# Patient Record
Sex: FEMALE | Race: WHITE | HISPANIC OR LATINO
[De-identification: names, ages, dates, MRNs, and addresses within clinical notes are randomized per-mention and may not be internally consistent; named-entity substitution may affect disease eponyms.]

---

## 2017-09-13 ENCOUNTER — TRANSCRIPTION ENCOUNTER (OUTPATIENT)
Age: 67
End: 2017-09-13

## 2020-02-13 ENCOUNTER — APPOINTMENT (OUTPATIENT)
Dept: RHEUMATOLOGY | Facility: CLINIC | Age: 70
End: 2020-02-13
Payer: MEDICAID

## 2020-02-13 VITALS — HEART RATE: 70 BPM | DIASTOLIC BLOOD PRESSURE: 82 MMHG | RESPIRATION RATE: 14 BRPM | SYSTOLIC BLOOD PRESSURE: 128 MMHG

## 2020-02-13 DIAGNOSIS — G51.0 BELL'S PALSY: ICD-10-CM

## 2020-02-13 DIAGNOSIS — I10 ESSENTIAL (PRIMARY) HYPERTENSION: ICD-10-CM

## 2020-02-13 DIAGNOSIS — E78.5 HYPERLIPIDEMIA, UNSPECIFIED: ICD-10-CM

## 2020-02-13 DIAGNOSIS — M54.2 CERVICALGIA: ICD-10-CM

## 2020-02-13 DIAGNOSIS — E11.9 TYPE 2 DIABETES MELLITUS W/OUT COMPLICATIONS: ICD-10-CM

## 2020-02-13 DIAGNOSIS — M54.9 DORSALGIA, UNSPECIFIED: ICD-10-CM

## 2020-02-13 PROBLEM — Z00.00 ENCOUNTER FOR PREVENTIVE HEALTH EXAMINATION: Status: ACTIVE | Noted: 2020-02-13

## 2020-02-13 PROCEDURE — 99204 OFFICE O/P NEW MOD 45 MIN: CPT

## 2020-02-13 RX ORDER — DILTIAZEM HYDROCHLORIDE 60 MG/1
TABLET, COATED ORAL
Refills: 0 | Status: ACTIVE | COMMUNITY

## 2020-02-13 RX ORDER — ROSUVASTATIN CALCIUM 5 MG/1
TABLET, FILM COATED ORAL
Refills: 0 | Status: ACTIVE | COMMUNITY

## 2020-02-13 RX ORDER — PIOGLITAZONE HYDROCHLORIDE 45 MG/1
TABLET ORAL
Refills: 0 | Status: ACTIVE | COMMUNITY

## 2020-02-13 RX ORDER — MECLIZINE HYDROCHLORIDE 25 MG/1
TABLET ORAL
Refills: 0 | Status: ACTIVE | COMMUNITY

## 2020-02-13 RX ORDER — ASPIRIN 81 MG
81 TABLET, DELAYED RELEASE (ENTERIC COATED) ORAL
Refills: 0 | Status: ACTIVE | COMMUNITY

## 2020-02-24 PROBLEM — M54.2 CERVICAL SPINE PAIN: Status: ACTIVE | Noted: 2020-02-24

## 2020-02-24 NOTE — REVIEW OF SYSTEMS
[Negative] : Heme/Lymph [Feeling Tired] : feeling tired [Dry Eyes] : dryness of the eyes [Heartburn] : heartburn [As Noted in HPI] : as noted in HPI [FreeTextEntry4] : dry mouth  [FreeTextEntry8] : CVA R sided pain

## 2020-02-24 NOTE — PHYSICAL EXAM
[General Appearance - Alert] : alert [General Appearance - In No Acute Distress] : in no acute distress [General Appearance - Well Nourished] : well nourished [Sclera] : the sclera and conjunctiva were normal [Extraocular Movements] : extraocular movements were intact [PERRL With Normal Accommodation] : pupils were equal in size, round, and reactive to light [Nasal Cavity] : the nasal mucosa and septum were normal [Neck Appearance] : the appearance of the neck was normal [Thyroid Diffuse Enlargement] : the thyroid was not enlarged [Auscultation Breath Sounds / Voice Sounds] : lungs were clear to auscultation bilaterally [Heart Rate And Rhythm] : heart rate was normal and rhythm regular [Heart Sounds] : normal S1 and S2 [Murmurs] : no murmurs [Edema] : there was no peripheral edema [Bowel Sounds] : normal bowel sounds [Cervical Lymph Nodes Enlarged Anterior Bilaterally] : anterior cervical [Abdomen Soft] : soft [Abdomen Tenderness] : non-tender [Supraclavicular Lymph Nodes Enlarged Bilaterally] : supraclavicular [No Spinal Tenderness] : no spinal tenderness [Abnormal Walk] : normal gait [Musculoskeletal - Swelling] : no joint swelling seen [Motor Tone] : muscle strength and tone were normal [Motor Exam] : the motor exam was normal [] : no rash [Oriented To Time, Place, And Person] : oriented to person, place, and time [No Focal Deficits] : no focal deficits [Affect] : the affect was normal [Impaired Insight] : insight and judgment were intact [FreeTextEntry1] : + R sided CVA tenderness

## 2020-02-24 NOTE — ASSESSMENT
[FreeTextEntry1] : ZULY JEWELL is a 70 year old woman who presents with prior dx of Sjogrens in [place], + oral and ocular sicca, fatigue, Rayanyds, some skin tightening over fingers. Also with chronic R sided CVA tenderness but no urinary symptoms. Previously on Rituxan infusions. + C spine TTP intermittently. \par \par - labs as below to w/u for CTD antibodies and preimmunosppression \par - check C spine XR to eval for instability \par - renal U/S to eval for CVA TTP \par - dental referral\par - optho referral for ocular sicca and PLQ baseline retinal check \par - dicussed PLQ initiation if G6PD normal as adjunct therapy \par - RTC in 3 weeks

## 2020-02-24 NOTE — HISTORY OF PRESENT ILLNESS
[FreeTextEntry1] : ZULY JEWELL is a 70 year old woman with prior dx of Sjogrens, here to establish rheumatologic care. Dx 2011 in [place], has been receiving care there since now. Initially on prednisone and Imuran, developed upper GI perforation so no further oral meds. Transitioned to Rituxan q6 months, with significant improvement in fatigue, last dose July 2019. No ADRs with Rituxan. \par \par + Ocular sicca -- on rx eye drops, not sure of name. No abrasions. \par + Oral sicca with tongue fissures -- Biotene, PO hydration \par No salivary gland pain but some chronic fullness \par + fatigue\par + b/l CTS \par + Raynauds without ulcerations, threatened digits\par + skin tightening limited to fingers \par \par No weakness, myalgias \par No F/C, night sweats, unintentional weight loss\par No arthralgias\par No rashes \par Renal disease 2/2 DM2, no Sjogrens involvement \par \par EGD with perforation, GERD, hiatal hernia -- stable on omeprazole\par Age appropriate malignancy screen - mammogram and pap UTD, never colonoscopy \par \par + chronic mild R sided CVA tenderness\par + chronic C spine pain with movement

## 2020-03-16 ENCOUNTER — APPOINTMENT (OUTPATIENT)
Dept: RHEUMATOLOGY | Facility: CLINIC | Age: 70
End: 2020-03-16

## 2020-06-15 ENCOUNTER — APPOINTMENT (OUTPATIENT)
Dept: RHEUMATOLOGY | Facility: CLINIC | Age: 70
End: 2020-06-15
Payer: MEDICAID

## 2020-06-15 VITALS
WEIGHT: 196 LBS | SYSTOLIC BLOOD PRESSURE: 102 MMHG | TEMPERATURE: 97.8 F | HEART RATE: 60 BPM | DIASTOLIC BLOOD PRESSURE: 62 MMHG | OXYGEN SATURATION: 99 %

## 2020-06-15 PROCEDURE — 99214 OFFICE O/P EST MOD 30 MIN: CPT

## 2020-06-15 NOTE — REVIEW OF SYSTEMS
[Feeling Tired] : feeling tired [Dry Eyes] : dryness of the eyes [Heartburn] : heartburn [As Noted in HPI] : as noted in HPI [Negative] : Genitourinary [FreeTextEntry4] : dry mouth

## 2020-06-15 NOTE — ASSESSMENT
[FreeTextEntry1] : ZULY JEWELL is a 70 year old woman with Sjogrens / mild CREST, previously on Rituxan infusions but has remained stable clinically off all DMARD meds since mid-2019. Able to function, sicca not severely debilitating, no advancement of skin changes, GERD, or worsening Rayanuds. + C spine pain with muscle involvement on R side and R lumbar paraspinal muscle pain, not present on exam today. \par \par - labs as below to monitor \par - discussed that given overall stability and lack of advancement since last visit, we will monitor her off medications at this time \par - dental f/u q6 months highly encouraged to maintain oral hygiene \par - optho evaluation q6 months for ocular sicca \par - RTC in 6 months, sooner if new/worsening sx

## 2020-06-15 NOTE — PHYSICAL EXAM
[General Appearance - Alert] : alert [Sclera] : the sclera and conjunctiva were normal [General Appearance - Well Nourished] : well nourished [General Appearance - In No Acute Distress] : in no acute distress [Extraocular Movements] : extraocular movements were intact [PERRL With Normal Accommodation] : pupils were equal in size, round, and reactive to light [Neck Appearance] : the appearance of the neck was normal [Nasal Cavity] : the nasal mucosa and septum were normal [Thyroid Diffuse Enlargement] : the thyroid was not enlarged [Heart Sounds] : normal S1 and S2 [Heart Rate And Rhythm] : heart rate was normal and rhythm regular [Edema] : there was no peripheral edema [Murmurs] : no murmurs [Abdomen Tenderness] : non-tender [Abdomen Soft] : soft [Bowel Sounds] : normal bowel sounds [No Spinal Tenderness] : no spinal tenderness [Supraclavicular Lymph Nodes Enlarged Bilaterally] : supraclavicular [Cervical Lymph Nodes Enlarged Anterior Bilaterally] : anterior cervical [Abnormal Walk] : normal gait [Musculoskeletal - Swelling] : no joint swelling seen [Motor Tone] : muscle strength and tone were normal [] : no rash [Motor Exam] : the motor exam was normal [No Focal Deficits] : no focal deficits [Impaired Insight] : insight and judgment were intact [Oriented To Time, Place, And Person] : oriented to person, place, and time [Affect] : the affect was normal [No CVA Tenderness] : no ~M costovertebral angle tenderness [FreeTextEntry1] : mild loss of skin folds distal fingers b/l, no limitation to ROM. Remainder of skin normal

## 2020-06-15 NOTE — DATA REVIEWED
[FreeTextEntry1] : Paper records reviewed, scanned to EMR. Pertinent labs and imaging summarized in HPI.

## 2020-06-15 NOTE — HISTORY OF PRESENT ILLNESS
[FreeTextEntry1] : ZULY JEWELL is a 70 year old woman with Sjogrens/ mild CREST overlap  (oral and ocular sicca, Raynauds, mild sclerodactyly, GERD, SSA/centromere +). Dx 2011 in [place] -- Initially on prednisone and Imuran, developed upper GI perforation so no further oral meds. Transitioned to Rituxan q6 months, with significant improvement in fatigue, last dose July 2019. No ADRs with Rituxan. \par \par + Ocular sicca -- No current eye drops, no significant crusting or chronic dryness. No abrasions. \par + Oral sicca with tongue fissures in the past -- managed with Biotene, PO hydration. No oral ulcers. \par No salivary gland pain but some chronic fullness \par + fatigue, stable, able to do all ADLs\par + b/l CTS, stable\par + Raynauds without ulcerations, threatened digits -- stable, not present currently 2/2 warmer temps \par + skin tightening limited to fingers and has not advanced in the last 6 months. \par \par No weakness, myalgias \par No F/C, night sweats, unintentional weight loss\par No arthralgias, synovitis \par No rashes \par Renal disease 2/2 DM2, no Sjogrens involvement \par R sided neck and back muscular pain -- tylenol helps \par R 3rd PIP triggering, mild\par \par EGD with perforation, GERD, hiatal hernia -- stable on omeprazole and started on new medication recently \par Age appropriate malignancy screen - mammogram and pap UTD, never colonoscopy \par \par No F/C, infectious sx. 1 episode of covid negative PNA, resolved. Has been observing covid recommendations of social distancing and wearing a mask when in public. \par \par Labs - + DALLAS, centromere, SSA \par Negative - Hep B/C, Quant, Igg4, SPEP, SSB, C3/4, RNP/Sm, Scl-70, ESR/CRP, RF/CCP, Thyroid Abs\par Renal US negative\par XR C spine - mild DJD

## 2020-12-21 ENCOUNTER — APPOINTMENT (OUTPATIENT)
Dept: RHEUMATOLOGY | Facility: CLINIC | Age: 70
End: 2020-12-21
Payer: MEDICAID

## 2020-12-21 VITALS
DIASTOLIC BLOOD PRESSURE: 79 MMHG | OXYGEN SATURATION: 99 % | SYSTOLIC BLOOD PRESSURE: 145 MMHG | TEMPERATURE: 97.9 F | WEIGHT: 205 LBS | HEART RATE: 62 BPM

## 2020-12-21 PROCEDURE — 99072 ADDL SUPL MATRL&STAF TM PHE: CPT

## 2020-12-21 PROCEDURE — 99214 OFFICE O/P EST MOD 30 MIN: CPT

## 2020-12-31 NOTE — REVIEW OF SYSTEMS
[Feeling Tired] : feeling tired [Dry Eyes] : dryness of the eyes [Heartburn] : heartburn [As Noted in HPI] : as noted in HPI [Negative] : Heme/Lymph [FreeTextEntry4] : dry mouth

## 2020-12-31 NOTE — HISTORY OF PRESENT ILLNESS
[FreeTextEntry1] : ZULY JEWELL is a 70 year old woman with Sjogrens/ mild CREST overlap  (oral and ocular sicca, Raynauds, mild sclerodactyly, GERD, SSA/centromere +). Dx 2011 in [place] -- Initially on prednisone and Imuran, developed upper GI perforation so no further oral meds. Transitioned to Rituxan q6 months, with significant improvement in fatigue, last dose July 2019. No ADRs with Rituxan. \par \par + Ocular sicca -- No current eye drops, no significant crusting or chronic dryness. No abrasions. \par + Oral sicca with tongue fissures in the past -- managed with Biotene, PO hydration. No oral ulcers. \par No salivary gland pain but some chronic fullness \par + fatigue, stable, able to do all ADLs\par + b/l CTS, stable\par + Raynauds without ulcerations, threatened digits -- stable, not present currently 2/2 warmer temps \par + skin tightening limited to fingers and has not advanced in the last 6 months. \par \par No weakness, myalgias \par No F/C, night sweats, unintentional weight loss\par No arthralgias, synovitis \par No rashes \par Renal disease 2/2 DM2, no Sjogrens involvement \par R sided neck and back muscular pain -- tylenol helps \par R 3rd PIP triggering, mild\par \par EGD with perforation, GERD, hiatal hernia -- stable on omeprazole and started on new medication recently \par Age appropriate malignancy screen - mammogram and pap UTD, never colonoscopy \par \par No F/C, infectious sx. 1 episode of covid negative PNA, resolved. Has been observing covid recommendations of social distancing and wearing a mask when in public. \par \par Labs - + DALLAS, centromere, SSA \par Negative - Hep B/C, Quant, Igg4, SPEP, SSB, C3/4, RNP/Sm, Scl-70, ESR/CRP, RF/CCP, Thyroid Abs\par Renal US negative\par XR C spine - mild DJD\par --------------\par \par 12/21/20 -- L shoulder, muscle spasm in back. Celebrex 200mg BID is helping. No other small joint pain or inflammatory sx, stable Raynauds, sicca actually slightly improved, no CREST sx. Feels well otherwise. \par

## 2020-12-31 NOTE — ASSESSMENT
[FreeTextEntry1] : ZULY JEWELL is a 70 year old woman with Sjogrens / mild CREST, previously on Rituxan infusions but has remained stable clinically off all DMARD meds since mid-2019. Able to function, sicca not severely debilitating, no advancement of skin changes, GERD, or worsening Rayanuds. + C spine pain with muscle involvement now on L side, controlled with NSAIDs. \par \par - given all sx are currently stable, will hold off on labs and treatment at present -- will continue to monitor her \par - dental f/u q6 months highly encouraged to maintain oral hygiene \par - optho evaluation q6 months for ocular sicca \par -  Non-pharmacological measures for Raynaud's - gloves, pocket warmers, limiting cold exposure \par - c/w celebrex with food \par - RTC in 6 months, sooner if new/worsening sx \par

## 2020-12-31 NOTE — PHYSICAL EXAM
[General Appearance - Alert] : alert [General Appearance - In No Acute Distress] : in no acute distress [General Appearance - Well Nourished] : well nourished [Sclera] : the sclera and conjunctiva were normal [PERRL With Normal Accommodation] : pupils were equal in size, round, and reactive to light [Extraocular Movements] : extraocular movements were intact [Nasal Cavity] : the nasal mucosa and septum were normal [Neck Appearance] : the appearance of the neck was normal [Thyroid Diffuse Enlargement] : the thyroid was not enlarged [Heart Rate And Rhythm] : heart rate was normal and rhythm regular [Heart Sounds] : normal S1 and S2 [Murmurs] : no murmurs [Edema] : there was no peripheral edema [Bowel Sounds] : normal bowel sounds [Abdomen Soft] : soft [Abdomen Tenderness] : non-tender [Cervical Lymph Nodes Enlarged Anterior Bilaterally] : anterior cervical [Supraclavicular Lymph Nodes Enlarged Bilaterally] : supraclavicular [No CVA Tenderness] : no ~M costovertebral angle tenderness [No Spinal Tenderness] : no spinal tenderness [Abnormal Walk] : normal gait [Musculoskeletal - Swelling] : no joint swelling seen [Motor Tone] : muscle strength and tone were normal [] : no rash [Motor Exam] : the motor exam was normal [No Focal Deficits] : no focal deficits [Oriented To Time, Place, And Person] : oriented to person, place, and time [Impaired Insight] : insight and judgment were intact [Affect] : the affect was normal [Cervical Lymph Nodes Enlarged Posterior Bilaterally] : posterior cervical [FreeTextEntry1] : mild loss of skin folds distal fingers b/l, no limitation to ROM. Remainder of skin normal

## 2021-01-22 ENCOUNTER — NON-APPOINTMENT (OUTPATIENT)
Age: 71
End: 2021-01-22

## 2021-02-16 ENCOUNTER — APPOINTMENT (OUTPATIENT)
Dept: RADIOLOGY | Facility: CLINIC | Age: 71
End: 2021-02-16
Payer: MEDICAID

## 2021-02-16 PROCEDURE — 71046 X-RAY EXAM CHEST 2 VIEWS: CPT

## 2021-02-24 ENCOUNTER — NON-APPOINTMENT (OUTPATIENT)
Age: 71
End: 2021-02-24

## 2021-06-12 ENCOUNTER — APPOINTMENT (OUTPATIENT)
Dept: RADIOLOGY | Facility: CLINIC | Age: 71
End: 2021-06-12
Payer: MEDICAID

## 2021-06-12 PROCEDURE — 77080 DXA BONE DENSITY AXIAL: CPT

## 2021-06-28 ENCOUNTER — APPOINTMENT (OUTPATIENT)
Dept: RHEUMATOLOGY | Facility: CLINIC | Age: 71
End: 2021-06-28
Payer: MEDICAID

## 2021-06-28 ENCOUNTER — NON-APPOINTMENT (OUTPATIENT)
Age: 71
End: 2021-06-28

## 2021-06-28 VITALS
TEMPERATURE: 97.7 F | BODY MASS INDEX: 39.75 KG/M2 | OXYGEN SATURATION: 98 % | WEIGHT: 216 LBS | HEIGHT: 62 IN | HEART RATE: 84 BPM | SYSTOLIC BLOOD PRESSURE: 166 MMHG | DIASTOLIC BLOOD PRESSURE: 81 MMHG

## 2021-06-28 PROCEDURE — 99214 OFFICE O/P EST MOD 30 MIN: CPT

## 2021-06-28 NOTE — HISTORY OF PRESENT ILLNESS
[FreeTextEntry1] : ZULY JEWELL is a 71 year old woman with Sjogrens/ mild CREST overlap  (oral and ocular sicca, Raynauds, mild sclerodactyly, GERD, SSA/centromere +). Dx 2011 in [place] -- Initially on prednisone and Imuran, developed upper GI perforation so no further oral meds. Transitioned to Rituxan q6 months, with significant improvement in fatigue, last dose July 2019. No ADRs with Rituxan. \par \par + Ocular sicca -- No current eye drops, no significant crusting or chronic dryness. No abrasions. \par + Oral sicca with tongue fissures in the past -- managed with Biotene, PO hydration. No oral ulcers. \par No salivary gland pain but some chronic fullness \par + fatigue, stable, able to do all ADLs\par + b/l CTS, stable\par + Raynauds without ulcerations, threatened digits -- stable, not present currently 2/2 warmer temps \par + skin tightening limited to fingers and has not advanced in the last 6 months. \par \par No weakness, myalgias \par No F/C, night sweats, unintentional weight loss\par No arthralgias, synovitis \par No rashes \par Renal disease 2/2 DM2, no Sjogrens involvement \par R sided neck and back muscular pain -- tylenol helps \par R 3rd PIP triggering, mild\par \par EGD with perforation, GERD, hiatal hernia -- stable on omeprazole and started on new medication recently \par Age appropriate malignancy screen - mammogram and pap UTD, never colonoscopy \par \par No F/C, infectious sx. 1 episode of covid negative PNA, resolved. Has been observing covid recommendations of social distancing and wearing a mask when in public. \par \par Labs - + DALLAS, centromere, SSA \par Negative - Hep B/C, Quant, Igg4, SPEP, SSB, C3/4, RNP/Sm, Scl-70, ESR/CRP, RF/CCP, Thyroid Abs\par Renal US negative\par XR C spine - mild DJD\par --------------\par \par 12/21/20 -- L shoulder, muscle spasm in back. Celebrex 200mg BID is helping. No other small joint pain or inflammatory sx, stable Raynauds, sicca actually slightly improved, no CREST sx. Feels well otherwise. \par \par 6/28/21 -- Feels well, stable fatigue, energy levels good, able to all ADLs. Sicca in eyes and mouth stable, no inflammatory arthritis, rashes, CP/SOB, GI or  sx, Raynauds stable. Recent R sided ear infection and PNA, resolved with Abx. Wants to get dental implants. Not using NSAIDs daily.

## 2021-06-28 NOTE — REVIEW OF SYSTEMS
[Dry Eyes] : dryness of the eyes [As Noted in HPI] : as noted in HPI [Negative] : Musculoskeletal [FreeTextEntry4] : dry mouth

## 2021-06-28 NOTE — PHYSICAL EXAM
[General Appearance - Alert] : alert [General Appearance - In No Acute Distress] : in no acute distress [General Appearance - Well Nourished] : well nourished [Sclera] : the sclera and conjunctiva were normal [PERRL With Normal Accommodation] : pupils were equal in size, round, and reactive to light [Extraocular Movements] : extraocular movements were intact [Nasal Cavity] : the nasal mucosa and septum were normal [Neck Appearance] : the appearance of the neck was normal [Thyroid Diffuse Enlargement] : the thyroid was not enlarged [Heart Rate And Rhythm] : heart rate was normal and rhythm regular [Heart Sounds] : normal S1 and S2 [Murmurs] : no murmurs [Edema] : there was no peripheral edema [Bowel Sounds] : normal bowel sounds [Abdomen Soft] : soft [Abdomen Tenderness] : non-tender [Cervical Lymph Nodes Enlarged Posterior Bilaterally] : posterior cervical [Cervical Lymph Nodes Enlarged Anterior Bilaterally] : anterior cervical [Supraclavicular Lymph Nodes Enlarged Bilaterally] : supraclavicular [No CVA Tenderness] : no ~M costovertebral angle tenderness [No Spinal Tenderness] : no spinal tenderness [Abnormal Walk] : normal gait [Musculoskeletal - Swelling] : no joint swelling seen [Motor Tone] : muscle strength and tone were normal [] : no rash [Motor Exam] : the motor exam was normal [No Focal Deficits] : no focal deficits [Oriented To Time, Place, And Person] : oriented to person, place, and time [Impaired Insight] : insight and judgment were intact [Affect] : the affect was normal [Outer Ear] : the ears and nose were normal in appearance [Both Tympanic Membranes Were Examined] : both tympanic membranes were normal [FreeTextEntry1] : mild loss of skin folds distal fingers b/l, no limitation to ROM. Remainder of skin normal

## 2021-06-28 NOTE — ASSESSMENT
[FreeTextEntry1] : ZULY JEWELL is a 71 year old woman with Sjogrens / mild CREST, previously on Rituxan infusions but has remained stable clinically off all DMARD meds since mid-2019. Able to function, sicca not severely debilitating, no advancement of skin changes, GERD, or worsening Rayanuds -- sx actually appear stable and sicca appears to be improving. Planning for dental implants. \par \par - given all sx are currently stable, will continue to hold off on systemic treatment at present -- will continue to monitor her \par - dental f/u q6 months maintain oral hygiene -- no rheum contraindication to planned implants \par - optho evaluation q6 months for ocular sicca \par -  Non-pharmacological measures for Raynaud's - gloves, pocket warmers, limiting cold exposure \par - will obtain recent labs and DEXA from [facility] for review\par - RTC in 12 months, sooner if new/worsening sx \par

## 2021-09-29 ENCOUNTER — EMERGENCY (EMERGENCY)
Facility: HOSPITAL | Age: 71
LOS: 1 days | End: 2021-09-29
Admitting: EMERGENCY MEDICINE
Payer: MEDICAID

## 2021-09-29 PROCEDURE — 99283 EMERGENCY DEPT VISIT LOW MDM: CPT

## 2021-10-06 PROBLEM — I10 ESSENTIAL HYPERTENSION: Status: ACTIVE | Noted: 2020-02-13

## 2022-06-27 ENCOUNTER — APPOINTMENT (OUTPATIENT)
Dept: RHEUMATOLOGY | Facility: CLINIC | Age: 72
End: 2022-06-27

## 2022-06-27 VITALS
HEART RATE: 66 BPM | DIASTOLIC BLOOD PRESSURE: 80 MMHG | BODY MASS INDEX: 37.54 KG/M2 | HEIGHT: 62 IN | SYSTOLIC BLOOD PRESSURE: 120 MMHG | WEIGHT: 204 LBS | TEMPERATURE: 97.9 F | OXYGEN SATURATION: 96 %

## 2022-06-27 DIAGNOSIS — L98.9 DISORDER OF THE SKIN AND SUBCUTANEOUS TISSUE, UNSPECIFIED: ICD-10-CM

## 2022-06-27 DIAGNOSIS — R68.2 DRY MOUTH, UNSPECIFIED: ICD-10-CM

## 2022-06-27 PROCEDURE — 99213 OFFICE O/P EST LOW 20 MIN: CPT

## 2022-07-13 PROBLEM — R68.2 DRY MOUTH: Status: ACTIVE | Noted: 2020-02-13

## 2022-07-13 NOTE — HISTORY OF PRESENT ILLNESS
[FreeTextEntry1] : ZULY JEWELL is a 71 year old woman with Sjogrens/ mild CREST overlap  (oral and ocular sicca, Raynauds, mild sclerodactyly, GERD, SSA/centromere +). Dx 2011 in [place] -- Initially on prednisone and Imuran, developed upper GI perforation so no further oral meds. Transitioned to Rituxan q6 months, with significant improvement in fatigue, last dose July 2019. No ADRs with Rituxan. \par \par + Ocular sicca -- No current eye drops, no significant crusting or chronic dryness. No abrasions. \par + Oral sicca with tongue fissures in the past -- managed with Biotene, PO hydration. No oral ulcers. \par No salivary gland pain but some chronic fullness \par + fatigue, stable, able to do all ADLs\par + b/l CTS, stable\par + Raynauds without ulcerations, threatened digits -- stable, not present currently 2/2 warmer temps \par + skin tightening limited to fingers and has not advanced in the last 6 months. \par \par No weakness, myalgias \par No F/C, night sweats, unintentional weight loss\par No arthralgias, synovitis \par No rashes \par Renal disease 2/2 DM2, no Sjogrens involvement \par R sided neck and back muscular pain -- tylenol helps \par R 3rd PIP triggering, mild\par \par EGD with perforation, GERD, hiatal hernia -- stable on omeprazole and started on new medication recently \par Age appropriate malignancy screen - mammogram and pap UTD, never colonoscopy \par \par No F/C, infectious sx. 1 episode of covid negative PNA, resolved. Has been observing covid recommendations of social distancing and wearing a mask when in public. \par \par Labs - + D[place], centromere, SSA \par Negative - Hep B/C, Quant, Igg4, SPEP, SSB, C3/4, RNP/Sm, Scl-70, ESR/CRP, RF/CCP, Thyroid Abs\par Renal US negative\par XR C spine - mild DJD\par --------------\par \par 12/21/20 -- L shoulder, muscle spasm in back. Celebrex 200mg BID is helping. No other small joint pain or inflammatory sx, stable Raynauds, sicca actually slightly improved, no CREST sx. Feels well otherwise. \par \par 6/28/21 -- Feels well, stable fatigue, energy levels good, able to all ADLs. Sicca in eyes and mouth stable, no inflammatory arthritis, rashes, CP/SOB, GI or  sx, Raynauds stable. Recent R sided ear infection and PNA, resolved with Abx. Wants to get dental implants. Not using NSAIDs daily. \par \par 6/27/22 -- Ocular sicca minimal, ongoing dry mouth but responsive to PO hydration and no glandular swelling. No rashes, no small joint sx, no CP/SOB, GI/, raynauds stable. S/p dental implants. Rash onR leg, no other areas.

## 2022-07-13 NOTE — REVIEW OF SYSTEMS
[Dry Eyes] : dryness of the eyes [Negative] : Heme/Lymph [As Noted in HPI] : as noted in HPI [Skin Lesions] : skin lesion [FreeTextEntry4] : dry mouth

## 2022-07-13 NOTE — ASSESSMENT
[FreeTextEntry1] : ZULY JEWELL is a 72 year old woman with --\par \par # Sjogrens / mild CREST, previously on Rituxan infusions but has remained stable clinically off all DMARD meds since mid-2019. Able to function, sicca not severely debilitating, no advancement of skin changes, GERD, or worsening Rayanuds -- sx actually appear stable and sicca appears to be improving. \par - given all sx are currently stable, will continue to hold off on systemic treatment at present -- will continue to monitor her \par - dental f/u q6 months maintain oral hygiene -- no rheum contraindication to planned implants \par - optho evaluation q6 months for ocular sicca \par -  Non-pharmacological measures for Raynaud's - gloves, pocket warmers, limiting cold exposure \par \par # rash - does not appear inflammatory\par - derm referral\par \par # osteopenia on DEXA\par - Dietary Ca 600mg BID with food, Vit D 1000 IU daily to optimize Ca/Vit D to maintain bone health.\par - Weight bearing exercise for 30min 3-4x/week to maintain BMD as tolerated encouraged. \par \par RTC in 12 months, sooner if new/worsening sx \par

## 2022-07-13 NOTE — PHYSICAL EXAM
[General Appearance - Alert] : alert [General Appearance - In No Acute Distress] : in no acute distress [General Appearance - Well Nourished] : well nourished [Sclera] : the sclera and conjunctiva were normal [PERRL With Normal Accommodation] : pupils were equal in size, round, and reactive to light [Extraocular Movements] : extraocular movements were intact [Outer Ear] : the ears and nose were normal in appearance [Both Tympanic Membranes Were Examined] : both tympanic membranes were normal [Nasal Cavity] : the nasal mucosa and septum were normal [Neck Appearance] : the appearance of the neck was normal [Heart Rate And Rhythm] : heart rate was normal and rhythm regular [Heart Sounds] : normal S1 and S2 [Murmurs] : no murmurs [Bowel Sounds] : normal bowel sounds [Edema] : there was no peripheral edema [Abdomen Soft] : soft [Abdomen Tenderness] : non-tender [Cervical Lymph Nodes Enlarged Posterior Bilaterally] : posterior cervical [Cervical Lymph Nodes Enlarged Anterior Bilaterally] : anterior cervical [Supraclavicular Lymph Nodes Enlarged Bilaterally] : supraclavicular [No CVA Tenderness] : no ~M costovertebral angle tenderness [No Spinal Tenderness] : no spinal tenderness [Abnormal Walk] : normal gait [Musculoskeletal - Swelling] : no joint swelling seen [Motor Tone] : muscle strength and tone were normal [] : no rash [Motor Exam] : the motor exam was normal [No Focal Deficits] : no focal deficits [Oriented To Time, Place, And Person] : oriented to person, place, and time [Impaired Insight] : insight and judgment were intact [Affect] : the affect was normal [FreeTextEntry1] : mild loss of skin folds distal fingers b/l, no limitation to ROM. Focal area of rash on RLE

## 2023-05-25 ENCOUNTER — APPOINTMENT (OUTPATIENT)
Dept: CT IMAGING | Facility: CLINIC | Age: 73
End: 2023-05-25
Payer: MEDICAID

## 2023-05-25 PROCEDURE — 71250 CT THORAX DX C-: CPT

## 2023-08-31 ENCOUNTER — APPOINTMENT (OUTPATIENT)
Dept: RHEUMATOLOGY | Facility: CLINIC | Age: 73
End: 2023-08-31
Payer: MEDICAID

## 2023-08-31 VITALS
DIASTOLIC BLOOD PRESSURE: 80 MMHG | BODY MASS INDEX: 34.23 KG/M2 | OXYGEN SATURATION: 97 % | TEMPERATURE: 97.2 F | SYSTOLIC BLOOD PRESSURE: 128 MMHG | HEIGHT: 62 IN | WEIGHT: 186 LBS | HEART RATE: 71 BPM | RESPIRATION RATE: 15 BRPM

## 2023-08-31 PROCEDURE — 99214 OFFICE O/P EST MOD 30 MIN: CPT

## 2023-08-31 NOTE — HISTORY OF PRESENT ILLNESS
[FreeTextEntry1] : ZULY JEWELL is a 71 year old woman with Sjogrens/ mild CREST overlap  (oral and ocular sicca, Raynauds, mild sclerodactyly, GERD, SSA/centromere +). Dx 2011 in [place] -- Initially on prednisone and Imuran, developed upper GI perforation so no further oral meds. Transitioned to Rituxan q6 months, with significant improvement in fatigue, last dose July 2019. No ADRs with Rituxan. \par  \par  + Ocular sicca -- No current eye drops, no significant crusting or chronic dryness. No abrasions. \par  + Oral sicca with tongue fissures in the past -- managed with Biotene, PO hydration. No oral ulcers. \par  No salivary gland pain but some chronic fullness \par  + fatigue, stable, able to do all ADLs\par  + b/l CTS, stable\par  + Raynauds without ulcerations, threatened digits -- stable, not present currently 2/2 warmer temps \par  + skin tightening limited to fingers and has not advanced in the last 6 months. \par  \par  No weakness, myalgias \par  No F/C, night sweats, unintentional weight loss\par  No arthralgias, synovitis \par  No rashes \par  Renal disease 2/2 DM2, no Sjogrens involvement \par  R sided neck and back muscular pain -- tylenol helps \par  R 3rd PIP triggering, mild\par  \par  EGD with perforation, GERD, hiatal hernia -- stable on omeprazole and started on new medication recently \par  Age appropriate malignancy screen - mammogram and pap UTD, never colonoscopy \par  \par  No F/C, infectious sx. 1 episode of covid negative PNA, resolved. Has been observing covid recommendations of social distancing and wearing a mask when in public. \par  \par  Labs - + [place], centromere, SSA \par  Negative - Hep B/C, Quant, Igg4, SPEP, SSB, C3/4, RNP/Sm, Scl-70, ESR/CRP, RF/CCP, Thyroid Abs\par  Renal US negative\par  XR C spine - mild DJD\par  --------------\par  \par  12/21/20 -- L shoulder, muscle spasm in back. Celebrex 200mg BID is helping. No other small joint pain or inflammatory sx, stable Raynauds, sicca actually slightly improved, no CREST sx. Feels well otherwise. \par  \par  6/28/21 -- Feels well, stable fatigue, energy levels good, able to all ADLs. Sicca in eyes and mouth stable, no inflammatory arthritis, rashes, CP/SOB, GI or  sx, Raynauds stable. Recent R sided ear infection and PNA, resolved with Abx. Wants to get dental implants. Not using NSAIDs daily. \par  \par  6/27/22 -- Ocular sicca minimal, ongoing dry mouth but responsive to PO hydration and no glandular swelling. No rashes, no small joint sx, no CP/SOB, GI/, raynauds stable. S/p dental implants. Rash onR leg, no other areas.

## 2023-08-31 NOTE — PHYSICAL EXAM
[General Appearance - Alert] : alert [General Appearance - In No Acute Distress] : in no acute distress [General Appearance - Well Nourished] : well nourished [Sclera] : the sclera and conjunctiva were normal [PERRL With Normal Accommodation] : pupils were equal in size, round, and reactive to light [Extraocular Movements] : extraocular movements were intact [Outer Ear] : the ears and nose were normal in appearance [Both Tympanic Membranes Were Examined] : both tympanic membranes were normal [Nasal Cavity] : the nasal mucosa and septum were normal [Neck Appearance] : the appearance of the neck was normal [Heart Rate And Rhythm] : heart rate was normal and rhythm regular [Heart Sounds] : normal S1 and S2 [Murmurs] : no murmurs [Edema] : there was no peripheral edema [Bowel Sounds] : normal bowel sounds [Abdomen Soft] : soft [Abdomen Tenderness] : non-tender [Cervical Lymph Nodes Enlarged Posterior Bilaterally] : posterior cervical [Cervical Lymph Nodes Enlarged Anterior Bilaterally] : anterior cervical [Supraclavicular Lymph Nodes Enlarged Bilaterally] : supraclavicular [No CVA Tenderness] : no ~M costovertebral angle tenderness [No Spinal Tenderness] : no spinal tenderness [Abnormal Walk] : normal gait [Musculoskeletal - Swelling] : no joint swelling seen [Motor Tone] : muscle strength and tone were normal [] : no rash [FreeTextEntry1] : mild loss of skin folds distal fingers b/l, no limitation to ROM. Focal area of rash on RLE  [Motor Exam] : the motor exam was normal [No Focal Deficits] : no focal deficits [Oriented To Time, Place, And Person] : oriented to person, place, and time [Impaired Insight] : insight and judgment were intact [Affect] : the affect was normal

## 2023-08-31 NOTE — REVIEW OF SYSTEMS
[Dry Eyes] : dryness of the eyes [As Noted in HPI] : as noted in HPI [Skin Lesions] : skin lesion [Negative] : Heme/Lymph [FreeTextEntry4] : dry mouth

## 2023-09-01 ENCOUNTER — OUTPATIENT (OUTPATIENT)
Dept: OUTPATIENT SERVICES | Facility: HOSPITAL | Age: 73
LOS: 1 days | End: 2023-09-01
Payer: MEDICAID

## 2023-09-01 DIAGNOSIS — R13.19 OTHER DYSPHAGIA: ICD-10-CM

## 2023-09-01 PROCEDURE — 91010 ESOPHAGUS MOTILITY STUDY: CPT

## 2023-09-14 ENCOUNTER — APPOINTMENT (OUTPATIENT)
Dept: RHEUMATOLOGY | Facility: CLINIC | Age: 73
End: 2023-09-14

## 2023-10-22 LAB
ALBUMIN SERPL ELPH-MCNC: 4.4 G/DL
ALP BLD-CCNC: 130 U/L
ALT SERPL-CCNC: 12 U/L
ANION GAP SERPL CALC-SCNC: 12 MMOL/L
APPEARANCE: CLEAR
AST SERPL-CCNC: 16 U/L
BACTERIA: ABNORMAL /HPF
BASOPHILS # BLD AUTO: 0.04 K/UL
BASOPHILS NFR BLD AUTO: 0.6 %
BILIRUB SERPL-MCNC: 0.3 MG/DL
BILIRUBIN URINE: NEGATIVE
BLOOD URINE: NEGATIVE
BUN SERPL-MCNC: 13 MG/DL
CALCIUM SERPL-MCNC: 9.6 MG/DL
CAST: 2 /LPF
CHLORIDE SERPL-SCNC: 108 MMOL/L
CO2 SERPL-SCNC: 24 MMOL/L
COLOR: YELLOW
CREAT SERPL-MCNC: 0.96 MG/DL
CRP SERPL-MCNC: <3 MG/L
EGFR: 62 ML/MIN/1.73M2
EOSINOPHIL # BLD AUTO: 0.26 K/UL
EOSINOPHIL NFR BLD AUTO: 3.7 %
EPITHELIAL CELLS: 6 /HPF
ERYTHROCYTE [SEDIMENTATION RATE] IN BLOOD BY WESTERGREN METHOD: 13 MM/HR
GLUCOSE QUALITATIVE U: NEGATIVE MG/DL
GLUCOSE SERPL-MCNC: 206 MG/DL
HBV CORE IGG+IGM SER QL: NONREACTIVE
HBV SURFACE AB SER QL: NONREACTIVE
HBV SURFACE AG SER QL: NONREACTIVE
HCT VFR BLD CALC: 34.8 %
HCV AB SER QL: NONREACTIVE
HCV S/CO RATIO: 0.04 S/CO
HGB BLD-MCNC: 10.7 G/DL
IMM GRANULOCYTES NFR BLD AUTO: 0.3 %
KETONES URINE: NEGATIVE MG/DL
LEUKOCYTE ESTERASE URINE: ABNORMAL
LYMPHOCYTES # BLD AUTO: 0.99 K/UL
LYMPHOCYTES NFR BLD AUTO: 13.9 %
MAN DIFF?: NORMAL
MCHC RBC-ENTMCNC: 28.2 PG
MCHC RBC-ENTMCNC: 30.7 GM/DL
MCV RBC AUTO: 91.6 FL
MICROSCOPIC-UA: NORMAL
MONOCYTES # BLD AUTO: 0.52 K/UL
MONOCYTES NFR BLD AUTO: 7.3 %
NEUTROPHILS # BLD AUTO: 5.29 K/UL
NEUTROPHILS NFR BLD AUTO: 74.2 %
NITRITE URINE: NEGATIVE
PH URINE: 6
PLATELET # BLD AUTO: 235 K/UL
POTASSIUM SERPL-SCNC: 4.7 MMOL/L
PROT SERPL-MCNC: 6.5 G/DL
PROTEIN URINE: NEGATIVE MG/DL
RBC # BLD: 3.8 M/UL
RBC # FLD: 14.1 %
RED BLOOD CELLS URINE: 2 /HPF
REVIEW: NORMAL
SODIUM SERPL-SCNC: 144 MMOL/L
SPECIFIC GRAVITY URINE: 1.02
UROBILINOGEN URINE: 0.2 MG/DL
WBC # FLD AUTO: 7.12 K/UL
WHITE BLOOD CELLS URINE: 3 /HPF

## 2023-10-23 ENCOUNTER — APPOINTMENT (OUTPATIENT)
Dept: RHEUMATOLOGY | Facility: CLINIC | Age: 73
End: 2023-10-23
Payer: MEDICAID

## 2023-10-23 VITALS
BODY MASS INDEX: 34.23 KG/M2 | SYSTOLIC BLOOD PRESSURE: 163 MMHG | OXYGEN SATURATION: 98 % | DIASTOLIC BLOOD PRESSURE: 82 MMHG | HEART RATE: 61 BPM | HEIGHT: 62 IN | TEMPERATURE: 97.3 F | WEIGHT: 186 LBS

## 2023-10-23 DIAGNOSIS — D64.9 ANEMIA, UNSPECIFIED: ICD-10-CM

## 2023-10-23 DIAGNOSIS — H04.123 DRY EYE SYNDROME OF BILATERAL LACRIMAL GLANDS: ICD-10-CM

## 2023-10-23 LAB
C3 SERPL-MCNC: 173 MG/DL
C4 SERPL-MCNC: 35 MG/DL

## 2023-10-23 PROCEDURE — 99214 OFFICE O/P EST MOD 30 MIN: CPT

## 2023-10-24 LAB
FERRITIN SERPL-MCNC: 13 NG/ML
IRON SATN MFR SERPL: 14 %
IRON SERPL-MCNC: 48 UG/DL
TIBC SERPL-MCNC: 352 UG/DL
UIBC SERPL-MCNC: 304 UG/DL

## 2023-10-25 LAB
CENTROMERE IGG SER-ACNC: >8 AL
ENA SS-A AB SER IA-ACNC: 0.2 AL
ENA SS-B AB SER IA-ACNC: <0.2 AL
M TB IFN-G BLD-IMP: NEGATIVE
QUANTIFERON TB PLUS MITOGEN MINUS NIL: 3.31 IU/ML
QUANTIFERON TB PLUS NIL: 0.12 IU/ML
QUANTIFERON TB PLUS TB1 MINUS NIL: 0 IU/ML
QUANTIFERON TB PLUS TB2 MINUS NIL: -0.04 IU/ML

## 2023-11-09 ENCOUNTER — APPOINTMENT (OUTPATIENT)
Dept: RADIOLOGY | Facility: CLINIC | Age: 73
End: 2023-11-09
Payer: MEDICAID

## 2023-11-09 PROCEDURE — 71048 X-RAY EXAM CHEST 4+ VIEWS: CPT

## 2024-03-18 ENCOUNTER — APPOINTMENT (OUTPATIENT)
Dept: RHEUMATOLOGY | Facility: CLINIC | Age: 74
End: 2024-03-18

## 2024-04-04 LAB
ALBUMIN SERPL ELPH-MCNC: 4.5 G/DL
ALP BLD-CCNC: 84 U/L
ALT SERPL-CCNC: 10 U/L
ANION GAP SERPL CALC-SCNC: 13 MMOL/L
APPEARANCE: CLEAR
AST SERPL-CCNC: 17 U/L
BACTERIA: NEGATIVE /HPF
BILIRUB SERPL-MCNC: 0.4 MG/DL
BILIRUBIN URINE: NEGATIVE
BLOOD URINE: NEGATIVE
BUN SERPL-MCNC: 19 MG/DL
C3 SERPL-MCNC: 138 MG/DL
C4 SERPL-MCNC: 33 MG/DL
CALCIUM SERPL-MCNC: 9.5 MG/DL
CAST: 0 /LPF
CHLORIDE SERPL-SCNC: 104 MMOL/L
CO2 SERPL-SCNC: 24 MMOL/L
COLOR: YELLOW
CREAT SERPL-MCNC: 1.07 MG/DL
CRP SERPL-MCNC: <3 MG/L
EGFR: 55 ML/MIN/1.73M2
EPITHELIAL CELLS: 4 /HPF
ERYTHROCYTE [SEDIMENTATION RATE] IN BLOOD BY WESTERGREN METHOD: 3 MM/HR
GLUCOSE QUALITATIVE U: NEGATIVE MG/DL
GLUCOSE SERPL-MCNC: 160 MG/DL
KETONES URINE: NEGATIVE MG/DL
LEUKOCYTE ESTERASE URINE: ABNORMAL
MICROSCOPIC-UA: NORMAL
NITRITE URINE: NEGATIVE
PH URINE: 6
POTASSIUM SERPL-SCNC: 4.6 MMOL/L
PROT SERPL-MCNC: 6.6 G/DL
PROTEIN URINE: NEGATIVE MG/DL
RED BLOOD CELLS URINE: 0 /HPF
REVIEW: NORMAL
SODIUM SERPL-SCNC: 141 MMOL/L
SPECIFIC GRAVITY URINE: 1.01
UROBILINOGEN URINE: 0.2 MG/DL
WHITE BLOOD CELLS URINE: 1 /HPF

## 2024-04-12 ENCOUNTER — APPOINTMENT (OUTPATIENT)
Dept: RHEUMATOLOGY | Facility: CLINIC | Age: 74
End: 2024-04-12
Payer: MEDICAID

## 2024-04-12 VITALS
WEIGHT: 183 LBS | OXYGEN SATURATION: 99 % | RESPIRATION RATE: 16 BRPM | TEMPERATURE: 98 F | BODY MASS INDEX: 33.68 KG/M2 | HEART RATE: 67 BPM | SYSTOLIC BLOOD PRESSURE: 146 MMHG | DIASTOLIC BLOOD PRESSURE: 72 MMHG | HEIGHT: 62 IN

## 2024-04-12 DIAGNOSIS — M34.1 CR(E)ST SYNDROME: ICD-10-CM

## 2024-04-12 DIAGNOSIS — M85.89 OTHER SPECIFIED DISORDERS OF BONE DENSITY AND STRUCTURE, MULTIPLE SITES: ICD-10-CM

## 2024-04-12 DIAGNOSIS — R13.10 DYSPHAGIA, UNSPECIFIED: ICD-10-CM

## 2024-04-12 DIAGNOSIS — G89.29 PAIN IN RIGHT KNEE: ICD-10-CM

## 2024-04-12 DIAGNOSIS — M25.561 PAIN IN RIGHT KNEE: ICD-10-CM

## 2024-04-12 DIAGNOSIS — M65.312 TRIGGER THUMB, LEFT THUMB: ICD-10-CM

## 2024-04-12 DIAGNOSIS — R21 RASH AND OTHER NONSPECIFIC SKIN ERUPTION: ICD-10-CM

## 2024-04-12 DIAGNOSIS — I73.00 RAYNAUD'S SYNDROME W/OUT GANGRENE: ICD-10-CM

## 2024-04-12 DIAGNOSIS — M35.00 SICCA SYNDROME, UNSPECIFIED: ICD-10-CM

## 2024-04-12 DIAGNOSIS — R25.2 CRAMP AND SPASM: ICD-10-CM

## 2024-04-12 PROCEDURE — 99214 OFFICE O/P EST MOD 30 MIN: CPT

## 2024-04-12 PROCEDURE — G2211 COMPLEX E/M VISIT ADD ON: CPT | Mod: NC,1L

## 2024-04-12 NOTE — ASSESSMENT
[FreeTextEntry1] : ZULY JEWELL is a 74 year old woman with --  # Sjogrens / mild CREST, previously on Rituxan infusions but has remained stable clinically off all DMARD meds since mid-2019. Able to function, sicca not severely debilitating, no advancement of skin changes, or worsening Raynaud's. However worsening dysphagia in last few months, EGD/manometry consistent with scleroderma aperistalsis, however p.o. intake has improved with conservative measures and twice daily PPI. - c/w PPI and conservative measures including taking all meds with food. If worsening sx, dropping weight, vomiting after meals will need to add medication like bethanechol with GI - 2023 ECHO and CT chest stable - will need annual screening with cardiology  - Current Pulm has moved, reported peripheral fibrotic changes on CT prior - will need eval in next 3-6 months with new Pulm  - will continue to hold off on systemic treatment at present, if worsening inflammatory sx such as pulmonary or cardiac consider cellcept vs Rituxan. Discussed minimal literature showing esophageal improvement with meds so risks outweigh benefits at present  - dental f/u q6 months maintain oral hygiene  - optho evaluation q6 months for ocular sicca - c/w non-pharmacological measures for Raynaud's - gloves, pocket warmers, limiting cold exposure - recent labs stable - repeat labs prior to next visit  - dermatology referral for LE rash for biopsy to see if ?related  - letter provided for her primary caregiver/daughter as she needs assistance with meds and visits    # R knee OA pain # L thumb triggering # muscle cramps - PT for knee if needed  - can consider ortho hand for trigger if becomes very painful  - c/w increase PO hydration and K intake via 1/2 banana daily - add on magnesium up to 400mg/day via food   # osteopenia on DEXA - Dietary Ca 600mg BID with food, Vit D 1000 IU daily to optimize Ca/Vit D to maintain bone health. - Weight bearing exercise for 30min 3-4x/week to maintain BMD as tolerated encouraged - repeat DEXA now   RTC 6 months, sooner if new/worsening sx

## 2024-04-12 NOTE — REVIEW OF SYSTEMS
[Dry Eyes] : dryness of the eyes [Negative] : Heme/Lymph [As Noted in HPI] : as noted in HPI [Arthralgias] : arthralgias [Joint Pain] : joint pain [Joint Swelling] : no joint swelling [Joint Stiffness] : no joint stiffness [FreeTextEntry4] : dry mouth  [FreeTextEntry7] : Esophageal aperistalsis [de-identified] : Sclerodactyly, mild Raynaud's

## 2024-04-12 NOTE — PHYSICAL EXAM
[General Appearance - Alert] : alert [General Appearance - In No Acute Distress] : in no acute distress [General Appearance - Well Nourished] : well nourished [Sclera] : the sclera and conjunctiva were normal [PERRL With Normal Accommodation] : pupils were equal in size, round, and reactive to light [Extraocular Movements] : extraocular movements were intact [Outer Ear] : the ears and nose were normal in appearance [Both Tympanic Membranes Were Examined] : both tympanic membranes were normal [Nasal Cavity] : the nasal mucosa and septum were normal [Neck Appearance] : the appearance of the neck was normal [Auscultation Breath Sounds / Voice Sounds] : lungs were clear to auscultation bilaterally [Heart Rate And Rhythm] : heart rate was normal and rhythm regular [Heart Sounds] : normal S1 and S2 [Murmurs] : no murmurs [Edema] : there was no peripheral edema [Bowel Sounds] : normal bowel sounds [Abdomen Soft] : soft [Abdomen Tenderness] : non-tender [Abdomen Mass (___ Cm)] : no abdominal mass palpated [No CVA Tenderness] : no ~M costovertebral angle tenderness [No Spinal Tenderness] : no spinal tenderness [Musculoskeletal - Swelling] : no joint swelling seen [Motor Tone] : muscle strength and tone were normal [] : no rash [Motor Exam] : the motor exam was normal [No Focal Deficits] : no focal deficits [Oriented To Time, Place, And Person] : oriented to person, place, and time [Impaired Insight] : insight and judgment were intact [Affect] : the affect was normal [Neck Cervical Mass (___cm)] : no neck mass was observed [FreeTextEntry1] : mild loss of skin folds distal fingers b/l - stable, no limitation to ROM.  No active Raynaud's. Nodular lesions on b/l LE shins with one on R shin largest - doesn't appear overtly inflammatory

## 2024-04-12 NOTE — HISTORY OF PRESENT ILLNESS
[FreeTextEntry1] : ZULY JEWELL is a 71 year old woman with Sjogrens/ mild CREST overlap  (oral and ocular sicca, Raynauds, mild sclerodactyly, GERD, SSA/centromere +). Dx 2011 in [place] -- Initially on prednisone and Imuran, developed upper GI perforation so no further oral meds. Transitioned to Rituxan q6 months, with significant improvement in fatigue, last dose July 2019. No ADRs with Rituxan.   + Ocular sicca -- No current eye drops, no significant crusting or chronic dryness. No abrasions.  + Oral sicca with tongue fissures in the past -- managed with Biotene, PO hydration. No oral ulcers.  No salivary gland pain but some chronic fullness  + fatigue, stable, able to do all ADLs + b/l CTS, stable + Raynauds without ulcerations, threatened digits -- stable, not present currently 2/2 warmer temps  + skin tightening limited to fingers and has not advanced in the last 6 months.   No weakness, myalgias  No F/C, night sweats, unintentional weight loss No arthralgias, synovitis  No rashes  Renal disease 2/2 DM2, no Sjogrens involvement  R sided neck and back muscular pain -- tylenol helps  R 3rd PIP triggering, mild  EGD with perforation, GERD, hiatal hernia -- stable on omeprazole and started on new medication recently  Age appropriate malignancy screen - mammogram and pap UTD, never colonoscopy   No F/C, infectious sx. 1 episode of covid negative PNA, resolved. Has been observing covid recommendations of social distancing and wearing a mask when in public.   Labs - + [place], centromere, SSA  Negative - Hep B/C, Quant, Igg4, SPEP, SSB, C3/4, RNP/Sm, Scl-70, ESR/CRP, RF/CCP, Thyroid Abs Renal US negative XR C spine - mild DJD --------------  12/21/20 -- L shoulder, muscle spasm in back. Celebrex 200mg BID is helping. No other small joint pain or inflammatory sx, stable Raynauds, sicca actually slightly improved, no CREST sx. Feels well otherwise.   6/28/21 -- Feels well, stable fatigue, energy levels good, able to all ADLs. Sicca in eyes and mouth stable, no inflammatory arthritis, rashes, CP/SOB, GI or  sx, Raynauds stable. Recent R sided ear infection and PNA, resolved with Abx. Wants to get dental implants. Not using NSAIDs daily.   6/27/22 -- Ocular sicca minimal, ongoing dry mouth but responsive to PO hydration and no glandular swelling. No rashes, no small joint sx, no CP/SOB, GI/, raynauds stable. S/p dental implants. Rash onR leg, no other areas.   8/31/23 -- Returns urgently 2/2 worsening sx over last few months, mainly issues with swallowing, ?aperistalsis, pending EGD to better define. Sicca and Raynaud's stable, no worsening sclerodactyly, denies CP/SOB/GARNICA/cough, lower GI sx,  sx, rashes, focal neuropathic sx.   10/23/23 --Improved esophageal symptoms with conservative measures, EGD showing no peristalsis, using PPI twice daily with benefit, declined bethanechol offered by GI, able to tolerate most foods, weight stable. Raynaud's, sicca, sclerodactyly stable, no other inflammatory sx. No falls, ambulates with cane, some spasms in feet.   4/12/24 -- GI sx stable on BID PPI but notes pills don't go down if not taking with some food, good about taking meds with food. Stable Raynaud's, sicca, sclerodactyly, no other inflammatory sx. Worsening of nodular pruritic rash on b/l shins, previously recommended to biopsy but pt deferred but now more active. R knee pain intermittently with walking. L thumb triggering but only mildly painful.

## 2024-07-16 ENCOUNTER — APPOINTMENT (OUTPATIENT)
Dept: OPHTHALMOLOGY | Facility: CLINIC | Age: 74
End: 2024-07-16

## 2024-09-24 ENCOUNTER — APPOINTMENT (OUTPATIENT)
Dept: RADIOLOGY | Facility: CLINIC | Age: 74
End: 2024-09-24
Payer: MEDICARE

## 2024-09-24 PROCEDURE — 77080 DXA BONE DENSITY AXIAL: CPT

## 2024-09-25 ENCOUNTER — LABORATORY RESULT (OUTPATIENT)
Age: 74
End: 2024-09-25

## 2024-09-25 ENCOUNTER — OFFICE (OUTPATIENT)
Dept: URBAN - METROPOLITAN AREA CLINIC 38 | Facility: CLINIC | Age: 74
Setting detail: OPHTHALMOLOGY
End: 2024-09-25
Payer: MEDICARE

## 2024-09-25 ENCOUNTER — RX ONLY (RX ONLY)
Age: 74
End: 2024-09-25

## 2024-09-25 DIAGNOSIS — H16.223: ICD-10-CM

## 2024-09-25 PROBLEM — Z96.1 PSEUDOPHAKIA ; BOTH EYES: Status: ACTIVE | Noted: 2024-09-25

## 2024-09-25 PROBLEM — E11.9 DIABETES TYPE 2 NO RETINOPATHY ; BOTH EYES: Status: ACTIVE | Noted: 2024-09-25

## 2024-09-25 PROCEDURE — 92134 CPTRZ OPH DX IMG PST SGM RTA: CPT

## 2024-09-25 PROCEDURE — 92004 COMPRE OPH EXAM NEW PT 1/>: CPT

## 2024-09-25 ASSESSMENT — CONFRONTATIONAL VISUAL FIELD TEST (CVF)
OD_FINDINGS: FULL
OS_FINDINGS: FULL

## 2024-10-21 ENCOUNTER — APPOINTMENT (OUTPATIENT)
Dept: RHEUMATOLOGY | Facility: CLINIC | Age: 74
End: 2024-10-21

## 2024-10-21 VITALS
TEMPERATURE: 97.8 F | OXYGEN SATURATION: 99 % | WEIGHT: 172 LBS | DIASTOLIC BLOOD PRESSURE: 80 MMHG | SYSTOLIC BLOOD PRESSURE: 138 MMHG | HEIGHT: 62 IN | BODY MASS INDEX: 31.65 KG/M2 | HEART RATE: 67 BPM

## 2024-10-21 PROCEDURE — 99204 OFFICE O/P NEW MOD 45 MIN: CPT

## 2024-10-21 PROCEDURE — G2211 COMPLEX E/M VISIT ADD ON: CPT

## 2024-10-21 RX ORDER — LOSARTAN POTASSIUM 100 MG/1
100 TABLET, FILM COATED ORAL
Refills: 0 | Status: ACTIVE | COMMUNITY

## 2024-10-21 RX ORDER — LABETALOL HYDROCHLORIDE 200 MG/1
200 TABLET, FILM COATED ORAL
Refills: 0 | Status: ACTIVE | COMMUNITY

## 2024-10-21 RX ORDER — ATORVASTATIN CALCIUM 20 MG/1
20 TABLET, FILM COATED ORAL
Refills: 0 | Status: ACTIVE | COMMUNITY

## 2024-10-21 RX ORDER — CHOLECALCIFEROL (VITAMIN D3) 25 MCG
TABLET ORAL
Refills: 0 | Status: ACTIVE | COMMUNITY

## 2024-10-21 RX ORDER — OMEPRAZOLE 40 MG/1
40 CAPSULE, DELAYED RELEASE ORAL
Refills: 0 | Status: ACTIVE | COMMUNITY

## 2024-10-21 RX ORDER — METFORMIN HYDROCHLORIDE 500 MG/1
500 TABLET, COATED ORAL
Refills: 0 | Status: ACTIVE | COMMUNITY

## 2024-11-27 ENCOUNTER — OFFICE (OUTPATIENT)
Dept: URBAN - METROPOLITAN AREA CLINIC 38 | Facility: CLINIC | Age: 74
Setting detail: OPHTHALMOLOGY
End: 2024-11-27
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
## 2024-11-27 PROCEDURE — NO SHOW FE NO SHOW FEE

## 2025-04-07 ENCOUNTER — APPOINTMENT (OUTPATIENT)
Dept: RHEUMATOLOGY | Facility: CLINIC | Age: 75
End: 2025-04-07
Payer: MEDICARE

## 2025-04-07 DIAGNOSIS — M85.89 OTHER SPECIFIED DISORDERS OF BONE DENSITY AND STRUCTURE, MULTIPLE SITES: ICD-10-CM

## 2025-04-07 DIAGNOSIS — R13.10 DYSPHAGIA, UNSPECIFIED: ICD-10-CM

## 2025-04-07 DIAGNOSIS — I73.00 RAYNAUD'S SYNDROME W/OUT GANGRENE: ICD-10-CM

## 2025-04-07 DIAGNOSIS — M35.00 SICCA SYNDROME, UNSPECIFIED: ICD-10-CM

## 2025-04-07 DIAGNOSIS — R21 RASH AND OTHER NONSPECIFIC SKIN ERUPTION: ICD-10-CM

## 2025-04-07 DIAGNOSIS — M34.1 CR(E)ST SYNDROME: ICD-10-CM

## 2025-04-07 PROCEDURE — G2211 COMPLEX E/M VISIT ADD ON: CPT

## 2025-04-07 PROCEDURE — 99214 OFFICE O/P EST MOD 30 MIN: CPT

## 2025-04-07 RX ORDER — ATORVASTATIN CALCIUM 20 MG/1
20 TABLET, FILM COATED ORAL
Refills: 0 | Status: ACTIVE | COMMUNITY

## 2025-04-07 RX ORDER — LOSARTAN POTASSIUM 100 MG/1
100 TABLET, FILM COATED ORAL
Refills: 0 | Status: ACTIVE | COMMUNITY

## 2025-04-07 RX ORDER — ASPIRIN 81 MG/1
81 TABLET, COATED ORAL
Qty: 90 | Refills: 0 | Status: COMPLETED | COMMUNITY
Start: 2024-11-06

## 2025-04-07 RX ORDER — METFORMIN ER 500 MG 500 MG/1
500 TABLET ORAL
Refills: 0 | Status: ACTIVE | COMMUNITY

## 2025-04-07 RX ORDER — LABETALOL HYDROCHLORIDE 200 MG/1
200 TABLET, FILM COATED ORAL
Refills: 0 | Status: ACTIVE | COMMUNITY

## 2025-04-07 RX ORDER — CEFUROXIME AXETIL 500 MG/1
500 TABLET, FILM COATED ORAL
Qty: 20 | Refills: 0 | Status: COMPLETED | COMMUNITY
Start: 2024-11-25

## 2025-04-07 RX ORDER — FEXOFENADINE HCL 180 MG/1
180 TABLET, FILM COATED ORAL
Qty: 90 | Refills: 0 | Status: COMPLETED | COMMUNITY
Start: 2024-07-18

## 2025-05-13 ENCOUNTER — NON-APPOINTMENT (OUTPATIENT)
Age: 75
End: 2025-05-13

## 2025-08-15 ENCOUNTER — RESULT REVIEW (OUTPATIENT)
Age: 75
End: 2025-08-15

## 2025-08-21 ENCOUNTER — NON-APPOINTMENT (OUTPATIENT)
Age: 75
End: 2025-08-21

## 2025-08-21 ENCOUNTER — APPOINTMENT (OUTPATIENT)
Dept: CARDIOLOGY | Facility: CLINIC | Age: 75
End: 2025-08-21
Payer: MEDICARE

## 2025-08-21 VITALS
SYSTOLIC BLOOD PRESSURE: 124 MMHG | BODY MASS INDEX: 33.65 KG/M2 | HEART RATE: 72 BPM | DIASTOLIC BLOOD PRESSURE: 60 MMHG | WEIGHT: 184 LBS | OXYGEN SATURATION: 99 %

## 2025-08-21 DIAGNOSIS — Z86.73 PERSONAL HISTORY OF TRANSIENT ISCHEMIC ATTACK (TIA), AND CEREBRAL INFARCTION W/OUT RESIDUAL DEFICITS: ICD-10-CM

## 2025-08-21 DIAGNOSIS — I10 ESSENTIAL (PRIMARY) HYPERTENSION: ICD-10-CM

## 2025-08-21 DIAGNOSIS — I51.7 CARDIOMEGALY: ICD-10-CM

## 2025-08-21 DIAGNOSIS — E78.5 HYPERLIPIDEMIA, UNSPECIFIED: ICD-10-CM

## 2025-08-21 DIAGNOSIS — R00.2 PALPITATIONS: ICD-10-CM

## 2025-08-21 PROCEDURE — 99205 OFFICE O/P NEW HI 60 MIN: CPT

## 2025-08-21 RX ORDER — CEFDINIR 300 MG/1
300 CAPSULE ORAL TWICE DAILY
Refills: 0 | Status: ACTIVE | COMMUNITY

## 2025-08-21 RX ORDER — VALSARTAN AND HYDROCHLOROTHIAZIDE 320; 12.5 MG/1; MG/1
320-12.5 TABLET, FILM COATED ORAL DAILY
Refills: 0 | Status: ACTIVE | COMMUNITY
Start: 2025-08-21

## 2025-08-21 RX ORDER — LABETALOL HYDROCHLORIDE 100 MG/1
100 TABLET, FILM COATED ORAL
Qty: 180 | Refills: 1 | Status: ACTIVE | COMMUNITY

## 2025-08-21 RX ORDER — GLIMEPIRIDE 2 MG/1
2 TABLET ORAL
Refills: 0 | Status: ACTIVE | COMMUNITY

## 2025-08-21 RX ORDER — EMPAGLIFLOZIN 25 MG/1
25 TABLET, FILM COATED ORAL DAILY
Qty: 30 | Refills: 0 | Status: ACTIVE | COMMUNITY

## 2025-08-21 RX ORDER — ROSUVASTATIN CALCIUM 20 MG/1
20 TABLET, FILM COATED ORAL
Qty: 90 | Refills: 2 | Status: ACTIVE | COMMUNITY
Start: 1900-01-01 | End: 1900-01-01

## 2025-08-21 RX ORDER — NIFEDIPINE 30 MG/1
30 TABLET, EXTENDED RELEASE ORAL DAILY
Qty: 90 | Refills: 3 | Status: ACTIVE | COMMUNITY

## 2025-09-12 ENCOUNTER — LABORATORY RESULT (OUTPATIENT)
Age: 75
End: 2025-09-12

## 2025-09-15 ENCOUNTER — APPOINTMENT (OUTPATIENT)
Dept: CARDIOLOGY | Facility: CLINIC | Age: 75
End: 2025-09-15
Payer: MEDICARE

## 2025-09-15 PROCEDURE — 93308 TTE F-UP OR LMTD: CPT

## 2025-09-15 PROCEDURE — 93325 DOPPLER ECHO COLOR FLOW MAPG: CPT

## 2025-09-15 PROCEDURE — 93321 DOPPLER ECHO F-UP/LMTD STD: CPT

## 2025-09-15 PROCEDURE — 96374 THER/PROPH/DIAG INJ IV PUSH: CPT | Mod: 59,JZ

## 2025-09-22 PROBLEM — I47.10 PAROXYSMAL SVT (SUPRAVENTRICULAR TACHYCARDIA): Status: ACTIVE | Noted: 2025-09-22
